# Patient Record
Sex: FEMALE | Race: WHITE | NOT HISPANIC OR LATINO | Employment: FULL TIME | URBAN - NONMETROPOLITAN AREA
[De-identification: names, ages, dates, MRNs, and addresses within clinical notes are randomized per-mention and may not be internally consistent; named-entity substitution may affect disease eponyms.]

---

## 2017-01-05 ENCOUNTER — OFFICE VISIT (OUTPATIENT)
Dept: URGENT CARE | Facility: CLINIC | Age: 37
End: 2017-01-05
Payer: COMMERCIAL

## 2017-01-05 PROCEDURE — G0382 LEV 3 HOSP TYPE B ED VISIT: HCPCS

## 2018-02-06 ENCOUNTER — OFFICE VISIT (OUTPATIENT)
Dept: URGENT CARE | Facility: CLINIC | Age: 38
End: 2018-02-06
Payer: COMMERCIAL

## 2018-02-06 VITALS — HEART RATE: 77 BPM | RESPIRATION RATE: 20 BRPM | OXYGEN SATURATION: 96 % | TEMPERATURE: 98.7 F

## 2018-02-06 DIAGNOSIS — J01.90 ACUTE SINUSITIS, RECURRENCE NOT SPECIFIED, UNSPECIFIED LOCATION: Primary | ICD-10-CM

## 2018-02-06 PROCEDURE — 99213 OFFICE O/P EST LOW 20 MIN: CPT | Performed by: PHYSICIAN ASSISTANT

## 2018-02-06 RX ORDER — LEVONORGESTREL AND ETHINYL ESTRADIOL AND ETHINYL ESTRADIOL 150-30(84)
KIT ORAL
COMMUNITY
Start: 2018-01-21 | End: 2020-02-18 | Stop reason: ALTCHOICE

## 2018-02-06 RX ORDER — AMOXICILLIN AND CLAVULANATE POTASSIUM 875; 125 MG/1; MG/1
1 TABLET, FILM COATED ORAL EVERY 12 HOURS SCHEDULED
Qty: 20 TABLET | Refills: 0 | Status: SHIPPED | COMMUNITY
Start: 2018-02-06 | End: 2018-02-16

## 2018-02-06 NOTE — PROGRESS NOTES
[address]  (office) [phone]  (fax) [phone]        NAME: Martina English is a 30 y o  female  : 1980    MRN: [id number]  DATE: 2018  TIME: 6:37 PM    Assessment and Plan   Acute sinusitis, recurrence not specified, unspecified location [J01 90]  1  Acute sinusitis, recurrence not specified, unspecified location  amoxicillin-clavulanate (AUGMENTIN) 875-125 mg per tablet         Patient Instructions   I have prescribed an antibiotic for the infection  Please take the antibiotic as prescribed and finish the entire prescription  I recommend that the patient takes an over the counter probiotic or eats yogurt with live cultures in it [place]) to keep good bacteria in the gut and help prevent diarrhea  Wash hands frequently to prevent the spread of infection  Can use over the counter cough and cold medications to help with symptoms  Ibuprofen and/or tylenol as needed for pain or fever  If not improving over the next 7-10 days, follow up with PCP  To present to the ER if symptoms worsen  Chief Complaint     Chief Complaint   Patient presents with    Facial Pain     Started 3 weks ago went away then came back  Ian Salgado LPN          History of Present Illness   Martina English presents to the clinic c/o    Sinusitis   This is a chronic problem  The current episode started 1 to 4 weeks ago  The problem is unchanged  There has been no fever  Associated symptoms include congestion, ear pain, headaches, sinus pressure and swollen glands  Pertinent negatives include no chills, coughing, diaphoresis, neck pain, shortness of breath, sneezing or sore throat  Past treatments include nothing  Review of Systems   Review of Systems   Constitutional: Negative for activity change, appetite change, chills, diaphoresis, fatigue and fever  HENT: Positive for congestion, ear pain, sinus pain and sinus pressure  Negative for ear discharge, facial swelling, rhinorrhea, sneezing and sore throat  Eyes: Negative for photophobia, pain, discharge, redness, itching and visual disturbance  Respiratory: Negative for apnea, cough, chest tightness, shortness of breath and wheezing  Cardiovascular: Negative for chest pain  Gastrointestinal: Negative for abdominal distention, abdominal pain, anal bleeding, blood in stool, constipation, diarrhea, nausea and vomiting  Genitourinary: Negative for dysuria, flank pain, frequency, hematuria and urgency  Musculoskeletal: Negative for arthralgias, back pain, gait problem, joint swelling, myalgias, neck pain and neck stiffness  Skin: Negative for color change, rash and wound  Allergic/Immunologic: Negative for immunocompromised state  Neurological: Positive for headaches  Negative for dizziness and facial asymmetry  Hematological: Negative for adenopathy  Psychiatric/Behavioral: Negative for confusion and decreased concentration  Current Medications     Long-Term Prescriptions   Medication Sig Dispense Refill    ASHLYNA 0 15-0 03 &0 01 MG TABS          Current Allergies     Allergies as of 02/06/2018    (No Known Allergies)            The following portions of the patient's history were reviewed and updated as appropriate: allergies, current medications, past family history, past medical history, past social history, past surgical history and problem list     Objective   Pulse 77   Temp 98 7 °F (37 1 °C) (Tympanic)   Resp 20   SpO2 96%        Physical Exam     Physical Exam   Constitutional: She is oriented to person, place, and time  She appears well-developed and well-nourished  No distress  HENT:   Head: Normocephalic and atraumatic  Right Ear: Tympanic membrane and external ear normal    Left Ear: Tympanic membrane and external ear normal    Nose: Mucosal edema present  Right sinus exhibits frontal sinus tenderness   Right sinus exhibits no maxillary sinus tenderness  Left sinus exhibits frontal sinus tenderness  Left sinus exhibits no maxillary sinus tenderness  Mouth/Throat: Oropharynx is clear and moist  No oropharyngeal exudate  Eyes: Conjunctivae and EOM are normal  Pupils are equal, round, and reactive to light  Right eye exhibits no discharge  Left eye exhibits no discharge  No scleral icterus  Neck: Normal range of motion  Neck supple  No JVD present  No tracheal deviation present  No thyromegaly present  Cardiovascular: Normal rate, regular rhythm and normal heart sounds  Exam reveals no gallop and no friction rub  No murmur heard  Pulmonary/Chest: Effort normal and breath sounds normal  No stridor  No respiratory distress  She has no wheezes  She has no rales  She exhibits no tenderness  Abdominal: Soft  Bowel sounds are normal  She exhibits no distension and no mass  There is no tenderness  There is no rebound and no guarding  Musculoskeletal: Normal range of motion  She exhibits no tenderness or deformity  Lymphadenopathy:     She has no cervical adenopathy  Neurological: She is alert and oriented to person, place, and time  She has normal reflexes  Coordination normal    Skin: Skin is warm and dry  No rash noted  She is not diaphoretic  No erythema  No pallor  Psychiatric: She has a normal mood and affect  Her behavior is normal  Judgment and thought content normal    Nursing note and vitals reviewed        Vicky Higgins PA-C

## 2018-02-06 NOTE — PATIENT INSTRUCTIONS
I have prescribed an antibiotic for the infection  Please take the antibiotic as prescribed and finish the entire prescription  I recommend that the patient takes an over the counter probiotic or eats yogurt with live cultures in it [place]) to keep good bacteria in the gut and help prevent diarrhea  Wash hands frequently to prevent the spread of infection  Can use over the counter cough and cold medications to help with symptoms  Ibuprofen and/or tylenol as needed for pain or fever  If not improving over the next 7-10 days, follow up with PCP  To present to the ER if symptoms worsen

## 2019-01-14 ENCOUNTER — OFFICE VISIT (OUTPATIENT)
Dept: OBGYN CLINIC | Facility: MEDICAL CENTER | Age: 39
End: 2019-01-14
Payer: COMMERCIAL

## 2019-01-14 ENCOUNTER — APPOINTMENT (OUTPATIENT)
Dept: RADIOLOGY | Facility: CLINIC | Age: 39
End: 2019-01-14
Payer: COMMERCIAL

## 2019-01-14 VITALS
HEART RATE: 88 BPM | SYSTOLIC BLOOD PRESSURE: 130 MMHG | HEIGHT: 67 IN | BODY MASS INDEX: 37.04 KG/M2 | WEIGHT: 236 LBS | DIASTOLIC BLOOD PRESSURE: 85 MMHG

## 2019-01-14 DIAGNOSIS — M22.40 CHONDROMALACIA PATELLAE SYNDROME, UNSPECIFIED LATERALITY: ICD-10-CM

## 2019-01-14 DIAGNOSIS — M25.561 PAIN IN BOTH KNEES, UNSPECIFIED CHRONICITY: Primary | ICD-10-CM

## 2019-01-14 DIAGNOSIS — M25.561 PAIN IN BOTH KNEES, UNSPECIFIED CHRONICITY: ICD-10-CM

## 2019-01-14 DIAGNOSIS — M25.562 PAIN IN BOTH KNEES, UNSPECIFIED CHRONICITY: Primary | ICD-10-CM

## 2019-01-14 DIAGNOSIS — M22.8X9 PATELLAR MALTRACKING, UNSPECIFIED LATERALITY: ICD-10-CM

## 2019-01-14 DIAGNOSIS — R29.898 WEAKNESS OF HIP, UNSPECIFIED LATERALITY: ICD-10-CM

## 2019-01-14 DIAGNOSIS — M25.562 PAIN IN BOTH KNEES, UNSPECIFIED CHRONICITY: ICD-10-CM

## 2019-01-14 PROCEDURE — 73564 X-RAY EXAM KNEE 4 OR MORE: CPT

## 2019-01-14 PROCEDURE — 99203 OFFICE O/P NEW LOW 30 MIN: CPT | Performed by: ORTHOPAEDIC SURGERY

## 2019-01-14 NOTE — PROGRESS NOTES
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  Plan:    Conservative treatment:    Ice to knee for 20 minutes at least 1-2 times daily  PT for ROM/strengthening to knee, hip and core  OTC NSAIDS prn for pain  Tylenol for pain  Weight loss discussed  Keep the knee straight whenever possilble  Let pain guide gradual return activities  Imaging: All imaging from today was reviewed by myself and explained to the patient  May consider an MRI at our next visit if her symptoms worsen or fail to improve by our next visit  Injection:    No Injection planned at this time  Surgery:     No surgery is recommended at this point, continue with conservative treatment plan as noted  Follow up:    Return in about 8 weeks (around 3/11/2019) for Recheck  Chief Complaint   Patient presents with    Right Knee - Pain    Left Knee - Pain       History of Present Illness: 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, referred to me by their primary care physician, seen in clinic for consultation of right greater than left knee pain  Linda Ferguson reports with 4 months of right greater than left knee pain  The pain was insidious in nature  At today's visit, the pain is located anterior, lateral, medial   The patient rates the pain as a 6/10  The pain has been present for 4 months  The pain is characterized as sharp, stabbing, dull, achy  The pain is present at all times  Pain is improved by rest, ice and NSAIDS  Pain is aggravated by stairs and sitting  Symptoms include cracking  The patient has tried rest, ice and NSAIDS  Linda Ferguson notes 15 years ago she had worsening right knee pain that she did physical therapy for and had an MRI         Knee Surgical History:  None    Past Medical, Social and Family History:  History reviewed  No pertinent past medical history  Past Surgical History:   Procedure Laterality Date     SECTION       No Known Allergies  Current Outpatient Prescriptions on File Prior to Visit   Medication Sig Dispense Refill    ASHLYNA 0 15-0 03 &0 01 MG TABS        No current facility-administered medications on file prior to visit  Social History     Social History    Marital status: /Civil Union     Spouse name: N/A    Number of children: N/A    Years of education: N/A     Occupational History    Not on file  Social History Main Topics    Smoking status: Never Smoker    Smokeless tobacco: Never Used    Alcohol use Yes      Comment: ocassional    Drug use: No    Sexual activity: Not on file     Other Topics Concern    Not on file     Social History Narrative    No narrative on file         I have reviewed the past medical, surgical, social and family history, medications and allergies as documented in the EMR  Review of systems: ROS is negative other than that noted in the HPI  Constitutional: Negative for fatigue and fever  HENT: Negative for sore throat  Respiratory: Negative for shortness of breath  Cardiovascular: Negative for chest pain  Gastrointestinal: Negative for abdominal pain  Endocrine: Negative for cold intolerance and heat intolerance  Genitourinary: Negative for flank pain  Musculoskeletal: Negative for back pain  Skin: Negative for rash  Allergic/Immunologic: Negative for immunocompromised state  Neurological: Negative for dizziness  Psychiatric/Behavioral: Negative for agitation  Physical Exam:    Blood pressure 130/85, pulse 88, height 5' 7" (1 702 m), weight 107 kg (236 lb)      General/Constitutional: NAD, well developed, well nourished  HENT: Normocephalic, atraumatic  CV: Intact distal pulses, regular rate  Resp: No respiratory distress or labored breathing  Lymphatic: No lymphadenopathy palpated  Neuro: Alert and Oriented x 3, no focal deficits  Psych: Normal mood, normal affect, normal judgement, normal behavior  Skin: Warm, dry, no rashes, no erythema      Knee Exam (focused):                RIGHT LEFT   ROM:   -5-130 -5-130   Palpation: Effusion negative negative     MJL tenderness Negative Negative     LJL tenderness Negative Negative   Instability: Varus stable stable     Valgus stable stable   Special Tests: Lachman Negative Negative     Posterior drawer Negative Negative     Anterior drawer Negative Negative     Pivot shift not tested not tested     Dial not tested not tested   Patella: Palpation no tenderness no tenderness     Mobility 1/2 1/2     Apprehension Negative Negative    J Sign Positive Positive   Other: Single leg 1/4 squat not tested not tested      LE NV Exam: +2 DP/PT pulses bilaterally  Sensation intact to light touch L2-S1 bilaterally     Bilateral hip ROM demonstrates no pain actively or passively    No calf tenderness to palpation bilaterally    Knee Imaging    X-rays of the bilateral knee were reviewed, which demonstrate no acute fracture, dislocation, or osseous abnormalities  I have reviewed the radiology report and do not currently have the reading from [facility] but will review it when received         Scribe Attestation    I,:   Ezekiel Calloway am acting as a scribe while in the presence of the attending physician :        I,:   Mina Dillon, DO personally performed the services described in this documentation    as scribed in my presence :

## 2019-01-16 ENCOUNTER — EVALUATION (OUTPATIENT)
Dept: PHYSICAL THERAPY | Facility: CLINIC | Age: 39
End: 2019-01-16
Payer: COMMERCIAL

## 2019-01-16 DIAGNOSIS — M25.561 PAIN IN BOTH KNEES, UNSPECIFIED CHRONICITY: ICD-10-CM

## 2019-01-16 DIAGNOSIS — M25.562 PAIN IN BOTH KNEES, UNSPECIFIED CHRONICITY: ICD-10-CM

## 2019-01-16 DIAGNOSIS — R29.898 WEAKNESS OF HIP, UNSPECIFIED LATERALITY: ICD-10-CM

## 2019-01-16 DIAGNOSIS — M22.40 CHONDROMALACIA PATELLAE SYNDROME, UNSPECIFIED LATERALITY: ICD-10-CM

## 2019-01-16 DIAGNOSIS — M22.8X9 PATELLAR MALTRACKING, UNSPECIFIED LATERALITY: ICD-10-CM

## 2019-01-16 PROCEDURE — 97535 SELF CARE MNGMENT TRAINING: CPT | Performed by: PHYSICAL THERAPIST

## 2019-01-16 PROCEDURE — G8979 MOBILITY GOAL STATUS: HCPCS | Performed by: PHYSICAL THERAPIST

## 2019-01-16 PROCEDURE — 97110 THERAPEUTIC EXERCISES: CPT | Performed by: PHYSICAL THERAPIST

## 2019-01-16 PROCEDURE — 97140 MANUAL THERAPY 1/> REGIONS: CPT | Performed by: PHYSICAL THERAPIST

## 2019-01-16 PROCEDURE — 97161 PT EVAL LOW COMPLEX 20 MIN: CPT | Performed by: PHYSICAL THERAPIST

## 2019-01-16 PROCEDURE — G8978 MOBILITY CURRENT STATUS: HCPCS | Performed by: PHYSICAL THERAPIST

## 2019-01-16 NOTE — PROGRESS NOTES
PT Evaluation     Today's date: 2019  Patient name: Laurie Campbell  : 1980  MRN: [id number]  Referring provider: Susannah Tomlinson DO  Dx:   Encounter Diagnosis     ICD-10-CM    1  Pain in both knees, unspecified chronicity M25 561 Ambulatory referral to Physical Therapy    M25 562    2  Chondromalacia patellae syndrome, unspecified laterality M22 40 Ambulatory referral to Physical Therapy   3  Patellar maltracking, unspecified laterality M22 8X9 Ambulatory referral to Physical Therapy   4  Weakness of hip, unspecified laterality R29 898 Ambulatory referral to Physical Therapy                  Assessment  Assessment details: PT notes the patient with decrease strength t/o the bilateral knee and LE leading to patellar mistracking with muscular imbalance with increase pain levels and functional limitations with need for course of skilled therapy for 2-4 weeks with focus on strengthening, manual therapy, analgesic modalities, and HEP  Impairments: activity intolerance, impaired physical strength, lacks appropriate home exercise program and pain with function  Understanding of Dx/Px/POC: good   Prognosis: good    Goals  ST  Initiate HEP  2  Decrease pain by 25-50%  3  Increase strength by 1-2 mm grades  LT  Decrease limitations with squatting and stairs  2  Return to recreational and gym/training activities  3  Improve LE flexibility  4  DC with HEP    Plan  Plan details: PT notes review of POC and findings with patient who is agreement with PT recommendations of course of skilled therapy     Patient would benefit from: PT eval and skilled physical therapy  Planned modality interventions: cryotherapy and thermotherapy: hydrocollator packs  Planned therapy interventions: balance, manual therapy, neuromuscular re-education, patient education, self care, strengthening, stretching, therapeutic exercise, home exercise program, graded exercise and flexibility  Frequency: 2x week  Duration in visits: 8  Duration in weeks: 4  Treatment plan discussed with: patient        Subjective Evaluation    History of Present Illness  Mechanism of injury: Patient reports dealing with bilateral knee pain for 10+ years but a recent flare up in symptoms about 3-4 months ago from unknown cause  Patient reports the increase symptoms has lead to limitations with squatting, ADL, recreational activities, and stairs  Patient went to ortho MD for evaluation and evaluated by x-ray with normal findings  Patient was found not to be a candidate for analgesic injection or surgery so referred to OPPT for evaluation and treatment of bilateral knee pain  Pain  Current pain ratin  At best pain ratin  At worst pain ratin  Location: Bilateral R>L   Quality: dull ache and sharp  Relieving factors: rest  Aggravating factors: stair climbing      Diagnostic Tests  X-ray: normal  Treatments  Current treatment: physical therapy  Patient Goals  Patient goals for therapy: decreased pain, increased strength, independence with ADLs/IADLs and return to sport/leisure activities          Objective     Tenderness     Additional Tenderness Details  PT notes no pain with palpation to the bilateral knees     Neurological Testing     Reflexes   Left   Patellar (L4): normal (2+)  Achilles (S1): normal (2+)    Right   Patellar (L4): normal (2+)  Achilles (S1): normal (2+)    Active Range of Motion   Left Hip   Flexion: 51 degrees     Right Hip   Flexion: 50 degrees   Left Knee   Normal active range of motion    Right Knee   Normal active range of motion  Left Ankle/Foot   Normal active range of motion    Right Ankle/Foot   Normal active range of motion    Additional Active Range of Motion Details  PT notes bilateral HS tightness     Passive Range of Motion   Left Hip   Flexion: 55 degrees     Right Hip   Flexion: 57 degrees     Mobility   Patellar Mobility:   Left Knee   WFL: medial, lateral, superior and inferior       Right Knee   WFL: medial, lateral, superior and inferior    Strength/Myotome Testing     Left Hip   Planes of Motion   Flexion: 4+  Extension: 5  Abduction: 4+  Adduction: 5  External rotation: 4  Internal rotation: 4+    Right Hip   Planes of Motion   Flexion: 4  Extension: 5  Abduction: 4+  Adduction: 5  External rotation: 4-  Internal rotation: 4    Left Knee   Flexion: 5  Extension: 4+    Right Knee   Flexion: 5  Extension: 4    Left Ankle/Foot   Normal strength    Right Ankle/Foot   Normal strength    Additional Strength Details  PT notes decrease strength t/o the bilateral knees and LE     Tests     Left Knee   Negative anterior Lachman, lateral Kami, medial Kami, posterior drawer, valgus stress test at 0 degrees and varus stress test at 0 degrees  Right Knee   Negative anterior Lachman, lateral Kami, medial Kami, posterior drawer, valgus stress test at 0 degrees and varus stress test at 0 degrees  Ambulation     Observational Gait   Walking speed and stride length within functional limits  Additional Observational Gait Details  PT notes the patient with demonstration of normal gait pattern and balance with static and dynamic activities         Flowsheet Rows      Most Recent Value   PT/OT G-Codes   FOTO information reviewed  Yes   Assessment Type  Evaluation   G code set  Mobility: Walking & Moving Around   Mobility: Walking and Moving Around Current Status ()  CK   Mobility: Walking and Moving Around Goal Status ()  CJ          Precautions:  None     Specialty Daily Treatment Diary     Manual  1/16       Bilateral hip and LE stretching and manual hip traction 15 min                                            Exercise Diary  1/16       [facility]  10 min L1        TBall wall squats with ball squeeze         Hip AB/AD machine         Leg and calf press-Bilat and S/L         Elliptical         Cable column walk-outs   All directions         Seated Tball trunk rotation and PNF        Seated Hip march and LACARLY MANSFIELD SLCHANEL         Lunge front and lateral         Monster walk        Side step and squat         S/L open door with TB         S/L hip ADD        Bridge with AB/AD                                            Modalities 1/16        CP/MHP to the bilateral knees in seated  PRN

## 2019-01-18 ENCOUNTER — APPOINTMENT (OUTPATIENT)
Dept: PHYSICAL THERAPY | Facility: CLINIC | Age: 39
End: 2019-01-18
Payer: COMMERCIAL

## 2019-01-21 ENCOUNTER — OFFICE VISIT (OUTPATIENT)
Dept: PHYSICAL THERAPY | Facility: CLINIC | Age: 39
End: 2019-01-21
Payer: COMMERCIAL

## 2019-01-21 DIAGNOSIS — M22.8X9 PATELLAR MALTRACKING, UNSPECIFIED LATERALITY: ICD-10-CM

## 2019-01-21 DIAGNOSIS — M25.561 PAIN IN BOTH KNEES, UNSPECIFIED CHRONICITY: Primary | ICD-10-CM

## 2019-01-21 DIAGNOSIS — M25.562 PAIN IN BOTH KNEES, UNSPECIFIED CHRONICITY: Primary | ICD-10-CM

## 2019-01-21 DIAGNOSIS — M22.40 CHONDROMALACIA PATELLAE SYNDROME, UNSPECIFIED LATERALITY: ICD-10-CM

## 2019-01-21 DIAGNOSIS — R29.898 WEAKNESS OF HIP, UNSPECIFIED LATERALITY: ICD-10-CM

## 2019-01-21 PROCEDURE — 97112 NEUROMUSCULAR REEDUCATION: CPT

## 2019-01-21 PROCEDURE — 97110 THERAPEUTIC EXERCISES: CPT

## 2019-01-21 PROCEDURE — 97140 MANUAL THERAPY 1/> REGIONS: CPT

## 2019-01-21 NOTE — PROGRESS NOTES
Daily Note     Today's date: 2019  Patient name: Lydia Glass  : 1980  MRN: [id number]  Referring provider: Jd Sánchez DO  Dx:   Encounter Diagnosis     ICD-10-CM    1  Pain in both knees, unspecified chronicity M25 561     M25 562    2  Chondromalacia patellae syndrome, unspecified laterality M22 40    3  Patellar maltracking, unspecified laterality M22 8X9    4  Weakness of hip, unspecified laterality R29 898                   Subjective: "I feel a little better  I think I am more cautious with how I am doing things "      Objective: See treatment diary below    Specialty Daily Treatment Diary     Manual        Bilateral hip and LE stretching and manual hip traction 15 min  15 min                                          Exercise Diary        [facility]  10 min L1  10 min L3      TBall wall squats with ball squeeze   2x10 Orange Tball Red PGB      Hip AB/AD machine   45+1# AB  45+1# AD      Leg and calf press-Bilat and S/L   Bilat Only 50# 2x10      Elliptical         Cable column walk-outs   All directions   36# 5x ea direction      Seated Tball trunk rotation and PNF        Seated Hip march and LAQ         BOSU march   2 min      BOSU SLR   10x ea Bilat      Lunge front and lateral         Monster walk  Green 6x 10 ft      Side step and squat   Green 6x 10 ft      S/L open door with TB   2x10 Green      S/L hip ABD        Bridge with AB/AD  2x10   AD Red Ball AB Green Band                                          Modalities       CP/MHP to the bilateral knees in seated  PRN  Declined                                Assessment: Tolerated treatment well  Patient was moderately restricted in HS, rotator & Quad flexibility  Patient was challenged by [place] activities  Patient demonstrated fatigue post treatment  Patient would benefit from continued skilled therapy to improve flexibility, strength and balance  Plan: Progress treatment as tolerated

## 2019-01-23 ENCOUNTER — OFFICE VISIT (OUTPATIENT)
Dept: PHYSICAL THERAPY | Facility: CLINIC | Age: 39
End: 2019-01-23
Payer: COMMERCIAL

## 2019-01-23 DIAGNOSIS — M25.561 PAIN IN BOTH KNEES, UNSPECIFIED CHRONICITY: Primary | ICD-10-CM

## 2019-01-23 DIAGNOSIS — M22.40 CHONDROMALACIA PATELLAE SYNDROME, UNSPECIFIED LATERALITY: ICD-10-CM

## 2019-01-23 DIAGNOSIS — R29.898 WEAKNESS OF HIP, UNSPECIFIED LATERALITY: ICD-10-CM

## 2019-01-23 DIAGNOSIS — M25.562 PAIN IN BOTH KNEES, UNSPECIFIED CHRONICITY: Primary | ICD-10-CM

## 2019-01-23 DIAGNOSIS — M22.8X9 PATELLAR MALTRACKING, UNSPECIFIED LATERALITY: ICD-10-CM

## 2019-01-23 PROCEDURE — 97110 THERAPEUTIC EXERCISES: CPT | Performed by: PHYSICAL THERAPIST

## 2019-01-23 PROCEDURE — 97140 MANUAL THERAPY 1/> REGIONS: CPT | Performed by: PHYSICAL THERAPIST

## 2019-01-23 NOTE — PROGRESS NOTES
Daily Note     Today's date: 2019  Patient name: Janay Vasquez  : 1980  MRN: [id number]  Referring provider: Basilia Argueta DO  Dx:   Encounter Diagnosis     ICD-10-CM    1  Pain in both knees, unspecified chronicity M25 561     M25 562    2  Chondromalacia patellae syndrome, unspecified laterality M22 40    3  Patellar maltracking, unspecified laterality M22 8X9    4  Weakness of hip, unspecified laterality R29 898                   Subjective:  Patient reports the knees have been feeling better since the start of therapy  Patient denies any pain in the knees t/o the session  Objective: See treatment diary below    Specialty Daily Treatment Diary     Manual       Bilateral hip and LE stretching and manual hip traction 15 min  15 min 15 min                                          Exercise Diary       [facility]  10 min L1  10 min L3 10 min L3      TBall wall squats with ball squeeze   2x10 Orange Tball Red PGB HEP      Hip AB/AD machine   45+1# AB  45+1# AD 2x10   45+1#/AD AB      Leg and calf press-Bilat and S/L   Bilat Only 50# 2x10 2x10   Bilat 50#      Elliptical         Cable column walk-outs   All directions   36# 5x ea direction 36#   5X Each   Direction      Seated Tball trunk rotation and PNF        Seated Hip march and LAQ    2x10 Bilat  2 0#      BOSU march   2 min 3 min      BOSU SLR   10x ea Bilat 10x Bilat     Lunge front and lateral         Monster walk  Green 6x 10 ft 6x10 Feet   Blue      Side step and squat   Green 6x 10 ft 6x10 Feet   Blue      S/L open door with TB   2x10 Green HEP      S/L hip ABD        Bridge with AB/AD  2x10   AD Red Ball AB Green Band HEP                                          Modalities      CP/MHP to the bilateral knees in seated  PRN  Declined Patient   Declined                                Assessment: Tolerated treatment well    PT notes continuation of progression of TE program with focus on gait/balance and manual therapy to decrease pain levels and improve functional limitations to meet therapy goals  PT will plan on DC with HEP next week secondary to patient with high copay with insurance and overall lower pain levels in the knees  Plan: Continue per plan of care

## 2019-01-28 ENCOUNTER — OFFICE VISIT (OUTPATIENT)
Dept: PHYSICAL THERAPY | Facility: CLINIC | Age: 39
End: 2019-01-28
Payer: COMMERCIAL

## 2019-01-28 DIAGNOSIS — M25.561 PAIN IN BOTH KNEES, UNSPECIFIED CHRONICITY: Primary | ICD-10-CM

## 2019-01-28 DIAGNOSIS — M22.8X9 PATELLAR MALTRACKING, UNSPECIFIED LATERALITY: ICD-10-CM

## 2019-01-28 DIAGNOSIS — R29.898 WEAKNESS OF HIP, UNSPECIFIED LATERALITY: ICD-10-CM

## 2019-01-28 DIAGNOSIS — M25.562 PAIN IN BOTH KNEES, UNSPECIFIED CHRONICITY: Primary | ICD-10-CM

## 2019-01-28 DIAGNOSIS — M22.40 CHONDROMALACIA PATELLAE SYNDROME, UNSPECIFIED LATERALITY: ICD-10-CM

## 2019-01-28 PROCEDURE — 97140 MANUAL THERAPY 1/> REGIONS: CPT | Performed by: PHYSICAL THERAPIST

## 2019-01-28 PROCEDURE — 97110 THERAPEUTIC EXERCISES: CPT | Performed by: PHYSICAL THERAPIST

## 2019-01-28 PROCEDURE — 97112 NEUROMUSCULAR REEDUCATION: CPT | Performed by: PHYSICAL THERAPIST

## 2019-01-28 NOTE — PROGRESS NOTES
Daily Note     Today's date: 2019  Patient name: Karlene Schultz  : 1980  MRN: [id number]  Referring provider: Yashira Caldera DO  Dx:   Encounter Diagnosis     ICD-10-CM    1  Pain in both knees, unspecified chronicity M25 561     M25 562    2  Chondromalacia patellae syndrome, unspecified laterality M22 40    3  Weakness of hip, unspecified laterality R29 898    4  Patellar maltracking, unspecified laterality M22 8X9                   Subjective:  Patient reports the knees felt pretty good over the weekend with no pain or limitations with lifting and carrying household objects up and down stairs  Patient reports minimal soreness in the bilateral knees at the start of the session           Objective: See treatment diary below    Specialty Daily Treatment Diary     Manual       Bilateral hip and LE stretching and manual hip traction 15 min  15 min 15 min  15 min                                         Exercise Diary      [facility]  10 min L1  10 min L3 10 min L3  10 min L3     TBall wall squats with ball squeeze   2x10 Orange Tball Red PGB HEP      Hip AB/AD machine   45+1# AB  45+1# AD 2x10   45+1#/AD AB  2x10   60#/AB  45+1#/AD    Leg and calf press-Bilat and S/L   Bilat Only 50# 2x10 2x10   Bilat 50#  2x10   Bilat  60#     Elliptical         Cable column walk-outs   All directions   36# 5x ea direction 36#   5X Each   Direction  36#   5X Each Direction     Seated Tball trunk rotation and PNF        Seated Tball Hip march and LAQ    2x10 Bilat  2 0#  NT     BOSU march   2 min 3 min  3 min     BOSU SLR   10x ea Bilat 10x Bilat 10x Bilat     Lunge front and lateral     2x10 Bilat     Monster walk  Green 6x 10 ft 6x10 Feet   Blue      Side step and squat   Green 6x 10 ft 6x10 Feet   Blue      S/L open door with TB   2x10 Green HEP      S/L hip ABD        Bridge with AB/AD  2x10   AD Red Ball AB Green Band HEP                                          Modalities  1/23 1/28     CP/MHP to the bilateral knees in seated  PRN  Declined Patient   Declined  Patient   Declined                             Assessment: Tolerated treatment well  PT notes modified treatment secondary to time constraints but PT will continue to progress toward therapy goals and full TE session  PT will plan on DC with HEP next session  Plan: Continue per plan of care

## 2019-01-30 ENCOUNTER — APPOINTMENT (OUTPATIENT)
Dept: PHYSICAL THERAPY | Facility: CLINIC | Age: 39
End: 2019-01-30
Payer: COMMERCIAL

## 2019-02-06 ENCOUNTER — OFFICE VISIT (OUTPATIENT)
Dept: PHYSICAL THERAPY | Facility: CLINIC | Age: 39
End: 2019-02-06
Payer: COMMERCIAL

## 2019-02-06 DIAGNOSIS — M25.561 PAIN IN BOTH KNEES, UNSPECIFIED CHRONICITY: Primary | ICD-10-CM

## 2019-02-06 DIAGNOSIS — R29.898 WEAKNESS OF HIP, UNSPECIFIED LATERALITY: ICD-10-CM

## 2019-02-06 DIAGNOSIS — M22.40 CHONDROMALACIA PATELLAE SYNDROME, UNSPECIFIED LATERALITY: ICD-10-CM

## 2019-02-06 DIAGNOSIS — M25.562 PAIN IN BOTH KNEES, UNSPECIFIED CHRONICITY: Primary | ICD-10-CM

## 2019-02-06 DIAGNOSIS — M22.8X9 PATELLAR MALTRACKING, UNSPECIFIED LATERALITY: ICD-10-CM

## 2019-02-06 PROCEDURE — 97140 MANUAL THERAPY 1/> REGIONS: CPT | Performed by: PHYSICAL THERAPIST

## 2019-02-06 PROCEDURE — 97110 THERAPEUTIC EXERCISES: CPT | Performed by: PHYSICAL THERAPIST

## 2019-02-06 PROCEDURE — 97535 SELF CARE MNGMENT TRAINING: CPT | Performed by: PHYSICAL THERAPIST

## 2019-02-06 NOTE — PROGRESS NOTES
PT Discharge    Today's date: 2019  Patient name: Leticia Verma  : 1980  MRN: [id number]  Referring provider: Mel Jaffe DO  Dx:   Encounter Diagnosis     ICD-10-CM    1  Pain in both knees, unspecified chronicity M25 561     M25 562    2  Chondromalacia patellae syndrome, unspecified laterality M22 40    3  Weakness of hip, unspecified laterality R29 898    4  Patellar maltracking, unspecified laterality M22 8X9                   Assessment  Assessment details: PT notes the patient with improved strength t/o the bilateral knee and LE with improved patellar tracking as well as improved flexibility t/o the bilateral hip and LE  PT notes the patient has met all therapy goals and is ready for a DC with HEP  Impairments: activity intolerance, impaired physical strength, lacks appropriate home exercise program and pain with function  Understanding of Dx/Px/POC: good   Prognosis: good    Goals  ST  Initiate HEP-MET   2  Decrease pain by 25-50%-MET   3  Increase strength by 1-2 mm grades-MET   LT  Decrease limitations with squatting and stairs-MET  2  Return to recreational and gym/training activities-MET   3  Improve LE flexibility-MET  4   DC with HEP-MET    Plan  Plan details: DC with HEP  Patient would benefit from: PT eval and skilled physical therapy  Planned modality interventions: cryotherapy and thermotherapy: hydrocollator packs  Planned therapy interventions: balance, manual therapy, neuromuscular re-education, patient education, self care, strengthening, stretching, therapeutic exercise, home exercise program, graded exercise and flexibility  Frequency: 2x week  Duration in visits: 1  Duration in weeks: 1  Treatment plan discussed with: patient        Subjective Evaluation    History of Present Illness  Mechanism of injury: Patient reports dealing with bilateral knee pain for 10+ years but a recent flare up in symptoms about 3-4 months ago from unknown cause    Patient reports the increase symptoms has lead to limitations with squatting, ADL, recreational activities, and stairs  Patient went to ortho MD for evaluation and evaluated by x-ray with normal findings  Patient was found not to be a candidate for analgesic injection or surgery so referred to OPPT for evaluation and treatment of bilateral knee pain  Presently the patient has attended 5 sessions of skilled therapy and feels 80-90% improvement since the start of therapy  Patient reports pain levels are down with increase strength and flexibility in the LE  Patient reports she is happy with current course of skilled therapy and feels she is ready for a DC with HEP     Pain  Current pain ratin  At best pain ratin  At worst pain ratin  Location: Bilateral R>L   Quality: dull ache and sharp  Relieving factors: rest  Aggravating factors: stair climbing  Progression: improved      Diagnostic Tests  X-ray: normal  Treatments  Current treatment: physical therapy  Patient Goals  Patient goals for therapy: decreased pain, increased strength, independence with ADLs/IADLs and return to sport/leisure activities          Objective     Tenderness     Additional Tenderness Details  PT notes no pain with palpation to the bilateral knees     Neurological Testing     Reflexes   Left   Patellar (L4): normal (2+)  Achilles (S1): normal (2+)    Right   Patellar (L4): normal (2+)  Achilles (S1): normal (2+)    Active Range of Motion   Left Hip   Flexion: 60 degrees     Right Hip   Flexion: 58 degrees   Left Knee   Normal active range of motion    Right Knee   Normal active range of motion  Left Ankle/Foot   Normal active range of motion    Right Ankle/Foot   Normal active range of motion    Additional Active Range of Motion Details  PT notes improved bilateral LE flexibility     Passive Range of Motion   Left Hip   Flexion: 61 degrees     Right Hip   Flexion: 60 degrees     Mobility   Patellar Mobility:   Left Knee   WFL: medial, lateral, superior and inferior  Right Knee   WFL: medial, lateral, superior and inferior    Strength/Myotome Testing     Left Hip   Planes of Motion   Flexion: 5  Extension: 5  Abduction: 5  Adduction: 5  External rotation: 5  Internal rotation: 5    Right Hip   Planes of Motion   Flexion: 5  Extension: 5  Abduction: 5  Adduction: 5  External rotation: 5  Internal rotation: 5    Left Knee   Flexion: 5  Extension: 5    Right Knee   Flexion: 5  Extension: 5    Left Ankle/Foot   Normal strength    Right Ankle/Foot   Normal strength    Additional Strength Details  PT notes improved strength t/o the bilateral knees and LE     Tests     Left Knee   Negative anterior Lachman, lateral Kami, medial Kami, posterior drawer, valgus stress test at 0 degrees and varus stress test at 0 degrees  Right Knee   Negative anterior Lachman, lateral Kami, medial Kami, posterior drawer, valgus stress test at 0 degrees and varus stress test at 0 degrees  Ambulation     Observational Gait   Walking speed and stride length within functional limits  Additional Observational Gait Details  PT notes the patient with demonstration of normal gait pattern and balance with static and dynamic activities             Precautions:  None     Specialty Daily Treatment Diary     Manual  1/16 1/21 1/23 1/28  2/6    Bilateral hip and LE stretching and manual hip traction 15 min  15 min 15 min  15 min  15 min                                        Exercise Diary  1/16 1/21 1/23 1/28 2/6    SRC  10 min L1  10 min L3 10 min L3  10 min L3  10 min L5    TBall wall squats with ball squeeze   2x10 Orange Tball Red PGB HEP      Hip AB/AD machine   45+1# AB  45+1# AD 2x10   45+1#/AD AB  2x10   60#/AB  45+1#/AD    Leg and calf press-Bilat and S/L   Bilat Only 50# 2x10 2x10   Bilat 50#  2x10   Bilat  60#     Elliptical         Cable column walk-outs   All directions   36# 5x ea direction 36#   5X Each   Direction  36#   5X Each Direction Seated Tball trunk rotation and PNF        Seated Tball Hip march and LAQ    2x10 Bilat  2 0#  NT     BOSU march   2 min 3 min  3 min     BOSU SLR   10x ea Bilat 10x Bilat 10x Bilat     Lunge front and lateral     2x10 Bilat     Monster walk  Green 6x 10 ft 6x10 Feet   Blue      Side step and squat   Green 6x 10 ft 6x10 Feet   Blue      S/L open door with TB   2x10 Green HEP      S/L hip ABD        Bridge with AB/AD  2x10   AD Red Ball AB Green Band HEP      HEP Update/review     X                               Modalities 1/16 1/21 1/23 1/28  2/6    CP/MHP to the bilateral knees in seated  PRN  Declined Patient   Declined  Patient   Declined  Patient   Declined

## 2019-04-01 ENCOUNTER — APPOINTMENT (OUTPATIENT)
Dept: RADIOLOGY | Facility: CLINIC | Age: 39
End: 2019-04-01
Payer: COMMERCIAL

## 2019-04-01 ENCOUNTER — OFFICE VISIT (OUTPATIENT)
Dept: URGENT CARE | Facility: CLINIC | Age: 39
End: 2019-04-01
Payer: COMMERCIAL

## 2019-04-01 VITALS
OXYGEN SATURATION: 97 % | DIASTOLIC BLOOD PRESSURE: 74 MMHG | WEIGHT: 236 LBS | RESPIRATION RATE: 18 BRPM | SYSTOLIC BLOOD PRESSURE: 134 MMHG | HEART RATE: 82 BPM | HEIGHT: 67 IN | BODY MASS INDEX: 37.04 KG/M2 | TEMPERATURE: 98 F

## 2019-04-01 DIAGNOSIS — B96.89 ACUTE BACTERIAL BRONCHITIS: Primary | ICD-10-CM

## 2019-04-01 DIAGNOSIS — R05.9 COUGH: ICD-10-CM

## 2019-04-01 DIAGNOSIS — J20.8 ACUTE BACTERIAL BRONCHITIS: Primary | ICD-10-CM

## 2019-04-01 PROCEDURE — 71046 X-RAY EXAM CHEST 2 VIEWS: CPT

## 2019-04-01 PROCEDURE — 99213 OFFICE O/P EST LOW 20 MIN: CPT | Performed by: PHYSICIAN ASSISTANT

## 2019-04-01 RX ORDER — AZITHROMYCIN 250 MG/1
TABLET, FILM COATED ORAL
Qty: 6 TABLET | Refills: 0 | Status: SHIPPED | COMMUNITY
Start: 2019-04-01 | End: 2019-04-05

## 2020-02-18 ENCOUNTER — OFFICE VISIT (OUTPATIENT)
Dept: URGENT CARE | Facility: CLINIC | Age: 40
End: 2020-02-18
Payer: COMMERCIAL

## 2020-02-18 VITALS
TEMPERATURE: 98.6 F | WEIGHT: 246 LBS | OXYGEN SATURATION: 99 % | DIASTOLIC BLOOD PRESSURE: 84 MMHG | RESPIRATION RATE: 20 BRPM | BODY MASS INDEX: 38.61 KG/M2 | HEART RATE: 72 BPM | HEIGHT: 67 IN | SYSTOLIC BLOOD PRESSURE: 136 MMHG

## 2020-02-18 DIAGNOSIS — S05.02XA ABRASION OF LEFT CORNEA, INITIAL ENCOUNTER: Primary | ICD-10-CM

## 2020-02-18 PROCEDURE — 99213 OFFICE O/P EST LOW 20 MIN: CPT | Performed by: PHYSICIAN ASSISTANT

## 2020-02-18 RX ORDER — TOBRAMYCIN 3 MG/ML
1 SOLUTION/ DROPS OPHTHALMIC
Qty: 5 ML | Refills: 0 | Status: SHIPPED | COMMUNITY
Start: 2020-02-18

## 2020-02-18 NOTE — PROGRESS NOTES
[address] [facility], [place]  (office) [phone]  (fax) [phone]        NAME: Yony Jiménez is a 42 y o  female  : 1980    MRN: [id number]  DATE: 2020  TIME: 5:43 PM    Assessment and Plan   Abrasion of left cornea, initial encounter [S05  02XA]  1  Abrasion of left cornea, initial encounter  tobramycin (TOBREX) 0 3 % SOLN       Patient Instructions   a  You have scratched your cornea, which is the outer most covering of your eyeball   b  Your pain will lessen each day  Take acetaminophen or ibuprofen for your pain  You may also apply cool compresses to your eye for your pain  c  Take the antibiotic eye drops as prescribed  d  DO NOT cover your eye with an eyepatch and DO NOT apply any eye drops to your eye except the antibiotic drops prescribed to you today  This will delay the healing of your eye and can cause additional, severe eye problems  e  Avoid touching your eye and eyelid because this can introduce bacteria to the scratch in your eye  Follow up with your family doctor or your eye doctor if symptoms are not much improved within 5 days  To present to the ER if symptoms worsen  Chief Complaint     Chief Complaint   Patient presents with    Eye Pain     scratched L eye with her contact 2 days ago         History of Present Illness   Yony Jiménez presents to the clinic c/o    Eye Pain    The left eye is affected  This is a new problem  The current episode started in the past 7 days  The problem occurs constantly  The problem has been unchanged  The injury mechanism was contact lenses  The pain is moderate  There is no known exposure to pink eye  She does not wear contacts  Pertinent negatives include no blurred vision, eye discharge, double vision, eye redness, fever, foreign body sensation, itching, nausea, photophobia, recent URI or vomiting  She has tried commercial eye wash for the symptoms   The treatment provided no relief  Review of Systems   Review of Systems   Constitutional: Negative for activity change, appetite change, chills, diaphoresis, fatigue and fever  HENT: Negative for congestion, ear discharge, ear pain, facial swelling, rhinorrhea, sinus pressure, sinus pain, sneezing and sore throat  Eyes: Positive for pain  Negative for blurred vision, double vision, photophobia, discharge, redness, itching and visual disturbance  Respiratory: Negative for apnea, cough, chest tightness, shortness of breath and wheezing  Cardiovascular: Negative for chest pain  Gastrointestinal: Negative for abdominal distention, abdominal pain, constipation, diarrhea, nausea and vomiting  Genitourinary: Negative for dysuria, flank pain, frequency, hematuria and urgency  Musculoskeletal: Negative for arthralgias, back pain, gait problem, joint swelling, myalgias, neck pain and neck stiffness  Skin: Negative for color change, rash and wound  Allergic/Immunologic: Negative for immunocompromised state  Neurological: Negative for dizziness and headaches  Hematological: Negative for adenopathy  Psychiatric/Behavioral: Negative for confusion  Current Medications     No long-term medications on file  Current Allergies     Allergies as of 2020    (No Known Allergies)            The following portions of the patient's history were reviewed and updated as appropriate: allergies, current medications, past family history, past medical history, past social history, past surgical history and problem list   History reviewed  No pertinent past medical history    Past Surgical History:   Procedure Laterality Date     SECTION       Social History     Socioeconomic History    Marital status: /Civil Union     Spouse name: Not on file    Number of children: Not on file    Years of education: Not on file    Highest education level: Not on file   Occupational History    Not on file   Social Needs    Financial resource strain: Not on file    Food insecurity:     Worry: Not on file     Inability: Not on file    Transportation needs:     Medical: Not on file     Non-medical: Not on file   Tobacco Use    Smoking status: Never Smoker    Smokeless tobacco: Never Used   Substance and Sexual Activity    Alcohol use: Yes     Frequency: Monthly or less     Drinks per session: 1 or 2     Comment: ocassional    Drug use: No    Sexual activity: Yes     Partners: Male   Lifestyle    Physical activity:     Days per week: Not on file     Minutes per session: Not on file    Stress: Not on file   Relationships    Social connections:     Talks on phone: Not on file     Gets together: Not on file     Attends [employer] service: Not on file     Active member of club or organization: Not on file     Attends meetings of clubs or organizations: Not on file     Relationship status: Not on file    Intimate partner violence:     Fear of current or ex partner: Not on file     Emotionally abused: Not on file     Physically abused: Not on file     Forced sexual activity: Not on file   Other Topics Concern    Not on file   Social History Narrative    Not on file       Objective   /84 (BP Location: Right arm, Patient Position: Sitting, Cuff Size: Standard)   Pulse 72   Temp 98 6 °F (37 °C) (Tympanic)   Resp 20   Ht 5' 7" (1 702 m)   Wt 112 kg (246 lb)   SpO2 99%   BMI 38 53 kg/m²      Physical Exam     Physical Exam   Constitutional: She is oriented to person, place, and time  She appears well-developed and well-nourished  No distress  HENT:   Head: Normocephalic and atraumatic  Right Ear: Tympanic membrane and external ear normal    Left Ear: Tympanic membrane and external ear normal    Nose: Nose normal    Mouth/Throat: Oropharynx is clear and moist  No oropharyngeal exudate  Eyes: Pupils are equal, round, and reactive to light  Conjunctivae and EOM are normal  Right eye exhibits no discharge   Left eye exhibits no discharge  No scleral icterus  Slit lamp exam:       The left eye shows corneal abrasion  Neck: Normal range of motion  Neck supple  No JVD present  No tracheal deviation present  No thyromegaly present  Cardiovascular: Normal rate, regular rhythm and normal heart sounds  Exam reveals no gallop and no friction rub  No murmur heard  Pulmonary/Chest: Effort normal and breath sounds normal  No stridor  No respiratory distress  She has no decreased breath sounds  She has no wheezes  She has no rhonchi  She has no rales  She exhibits no tenderness  Musculoskeletal: Normal range of motion  She exhibits no tenderness or deformity  Lymphadenopathy:     She has no cervical adenopathy  Neurological: She is alert and oriented to person, place, and time  Coordination normal    Skin: Skin is warm and dry  No rash noted  She is not diaphoretic  No erythema  No pallor  Psychiatric: She has a normal mood and affect  Her behavior is normal  Judgment and thought content normal    Nursing note and vitals reviewed        Mare Mead PA-C

## 2021-12-10 ENCOUNTER — NURSE TRIAGE (OUTPATIENT)
Dept: OTHER | Facility: OTHER | Age: 41
End: 2021-12-10

## 2021-12-10 DIAGNOSIS — Z20.822 ENCOUNTER FOR SCREENING LABORATORY TESTING FOR COVID-19 VIRUS: Primary | ICD-10-CM

## 2021-12-10 PROCEDURE — U0003 INFECTIOUS AGENT DETECTION BY NUCLEIC ACID (DNA OR RNA); SEVERE ACUTE RESPIRATORY SYNDROME CORONAVIRUS 2 (SARS-COV-2) (CORONAVIRUS DISEASE [COVID-19]), AMPLIFIED PROBE TECHNIQUE, MAKING USE OF HIGH THROUGHPUT TECHNOLOGIES AS DESCRIBED BY CMS-2020-01-R: HCPCS | Performed by: FAMILY MEDICINE

## 2021-12-10 PROCEDURE — U0005 INFEC AGEN DETEC AMPLI PROBE: HCPCS | Performed by: FAMILY MEDICINE

## 2023-03-22 ENCOUNTER — OFFICE VISIT (OUTPATIENT)
Dept: URGENT CARE | Facility: CLINIC | Age: 43
End: 2023-03-22

## 2023-03-22 VITALS
DIASTOLIC BLOOD PRESSURE: 75 MMHG | SYSTOLIC BLOOD PRESSURE: 135 MMHG | HEIGHT: 66 IN | TEMPERATURE: 97 F | HEART RATE: 87 BPM | OXYGEN SATURATION: 98 % | RESPIRATION RATE: 16 BRPM | BODY MASS INDEX: 40.98 KG/M2 | WEIGHT: 255 LBS

## 2023-03-22 DIAGNOSIS — J02.0 STREP PHARYNGITIS: Primary | ICD-10-CM

## 2023-03-22 DIAGNOSIS — J02.9 SORE THROAT: ICD-10-CM

## 2023-03-22 LAB — S PYO AG THROAT QL: POSITIVE

## 2023-03-22 RX ORDER — DIPHENOXYLATE HYDROCHLORIDE AND ATROPINE SULFATE 2.5; .025 MG/1; MG/1
1 TABLET ORAL DAILY
COMMUNITY

## 2023-03-22 RX ORDER — AMOXICILLIN 500 MG/1
500 CAPSULE ORAL EVERY 12 HOURS SCHEDULED
Qty: 20 CAPSULE | Refills: 0 | Status: SHIPPED | OUTPATIENT
Start: 2023-03-22 | End: 2023-04-01

## 2023-03-22 RX ORDER — PREDNISONE 10 MG/1
TABLET ORAL
Qty: 21 TABLET | Refills: 0 | Status: SHIPPED | OUTPATIENT
Start: 2023-03-22

## 2023-03-22 NOTE — PROGRESS NOTES
[address] [facility]        NAME: Kenyatta Knox is a 41 y o  female  : 1980    MRN: [id number]  DATE: 2023  TIME: 12:24 PM    Assessment and Plan   Strep pharyngitis [J02 0]  1  Strep pharyngitis  amoxicillin (AMOXIL) 500 mg capsule    predniSONE 10 mg tablet      2  Sore throat  POCT rapid strepA            Patient Instructions     Take the antibiotics with food  Finish the entire course of antibiotics  Use a warm mist humidifier or vaporizer  Hot tea with honey  Warm saline gargle or throat lozenge may help with a sore throat  OTC saline nasal sprays   Drink plenty of fluids  The rapid strep was negative  A throat culture was sent and will take two days  Take prednisone with food  Do not take with NSAIDs  Take tylenol as needed for any fevers  Follow up with PCP in 3-5 days  Proceed to  ER if symptoms worsen  Chief Complaint     Chief Complaint   Patient presents with   • Sore Throat     Started on  with headache fever and sore throat  swelling in throat and ear pain in right ear          History of Present Illness       Sore Throat   This is a new problem  The current episode started in the past 7 days  The problem has been gradually worsening  Neither side of throat is experiencing more pain than the other  There has been no fever  The pain is moderate (Patient states she has difficulty with swallowing food due to the pain  )  Associated symptoms include congestion, ear pain, swollen glands and trouble swallowing  Pertinent negatives include no abdominal pain, coughing, diarrhea, headaches, hoarse voice or vomiting  She has tried acetaminophen for the symptoms  The treatment provided mild relief  Review of Systems   Review of Systems   Constitutional: Positive for chills and fever  HENT: Positive for congestion, ear pain, sore throat and trouble swallowing  Negative for hoarse voice  Respiratory: Negative for cough      Gastrointestinal: Negative for abdominal pain, diarrhea and vomiting  Neurological: Negative for headaches  All other systems reviewed and are negative  Current Medications       Current Outpatient Medications:   •  amoxicillin (AMOXIL) 500 mg capsule, Take 1 capsule (500 mg total) by mouth every 12 (twelve) hours for 10 days, Disp: 20 capsule, Rfl: 0  •  predniSONE 10 mg tablet, Take 6 pills day 1, then 5 pills day 2, 4 pills day 3, 3 pills day 4, 2 pills day 5, 1 pill day 6,, Disp: 21 tablet, Rfl: 0  •  multivitamin (THERAGRAN) TABS, Take 1 tablet by mouth daily, Disp: , Rfl:     Current Allergies     Allergies as of 2023   • (No Known Allergies)            The following portions of the patient's history were reviewed and updated as appropriate: allergies, current medications, past family history, past medical history, past social history, past surgical history and problem list      No past medical history on file  Past Surgical History:   Procedure Laterality Date   •  SECTION         Family History   Problem Relation Age of Onset   • No Known Problems Mother    • No Known Problems Father          Medications have been verified  Objective   /75   Pulse 87   Temp (!) 97 °F (36 1 °C)   Resp 16   Ht 5' 6" (1 676 m)   Wt 116 kg (255 lb)   SpO2 98%   BMI 41 16 kg/m²        Physical Exam     Physical Exam  Vitals reviewed  Constitutional:       Appearance: She is well-developed and normal weight  HENT:      Head: Normocephalic and atraumatic  Right Ear: Tympanic membrane and ear canal normal       Left Ear: Tympanic membrane and ear canal normal       Nose: No congestion or rhinorrhea  Mouth/Throat:      Mouth: Mucous membranes are moist       Pharynx: Uvula midline  Pharyngeal swelling and posterior oropharyngeal erythema present  No oropharyngeal exudate  Tonsils: No tonsillar exudate or tonsillar abscesses     Eyes:      Conjunctiva/sclera: Conjunctivae normal       Pupils: Pupils are equal, round, and reactive to light  Cardiovascular:      Rate and Rhythm: Normal rate and regular rhythm  Heart sounds: Normal heart sounds  Pulmonary:      Effort: Pulmonary effort is normal       Breath sounds: Normal breath sounds  No stridor  No wheezing, rhonchi or rales  Musculoskeletal:      Cervical back: Normal range of motion  Skin:     General: Skin is warm and dry  Neurological:      General: No focal deficit present  Mental Status: She is alert and oriented to person, place, and time     Psychiatric:         Mood and Affect: Mood normal          Behavior: Behavior normal

## 2023-03-22 NOTE — PATIENT INSTRUCTIONS
Take the antibiotics with food  Finish the entire course of antibiotics  Use a warm mist humidifier or vaporizer  Hot tea with honey  Warm saline gargle or throat lozenge may help with a sore throat  OTC saline nasal sprays   Drink plenty of fluids  The rapid strep was negative  A throat culture was sent and will take two days  Take prednisone with food  Do not take with NSAIDs  Take tylenol as needed for any fevers

## 2024-12-31 ENCOUNTER — HOSPITAL ENCOUNTER (EMERGENCY)
Facility: HOSPITAL | Age: 44
Discharge: HOME | End: 2024-12-31
Attending: STUDENT IN AN ORGANIZED HEALTH CARE EDUCATION/TRAINING PROGRAM
Payer: COMMERCIAL

## 2024-12-31 ENCOUNTER — APPOINTMENT (EMERGENCY)
Dept: RADIOLOGY | Facility: HOSPITAL | Age: 44
End: 2024-12-31
Payer: COMMERCIAL

## 2024-12-31 VITALS
WEIGHT: 275 LBS | BODY MASS INDEX: 43.16 KG/M2 | TEMPERATURE: 97.3 F | DIASTOLIC BLOOD PRESSURE: 94 MMHG | HEIGHT: 67 IN | OXYGEN SATURATION: 96 % | SYSTOLIC BLOOD PRESSURE: 148 MMHG | HEART RATE: 92 BPM | RESPIRATION RATE: 18 BRPM

## 2024-12-31 DIAGNOSIS — S93.402A SPRAIN OF LEFT ANKLE, UNSPECIFIED LIGAMENT, INITIAL ENCOUNTER: Primary | ICD-10-CM

## 2024-12-31 PROCEDURE — 99283 EMERGENCY DEPT VISIT LOW MDM: CPT

## 2024-12-31 PROCEDURE — 73610 X-RAY EXAM OF ANKLE: CPT

## 2024-12-31 PROCEDURE — 99284 EMERGENCY DEPT VISIT MOD MDM: CPT | Performed by: STUDENT IN AN ORGANIZED HEALTH CARE EDUCATION/TRAINING PROGRAM

## 2024-12-31 NOTE — DISCHARGE INSTRUCTIONS
The imaging studies that were obtained did not show signs of fracture or dislocation.    Wear the ankle air splint for comfort.  For pain, you can take ibuprofen 600 mg every 6 hours and Tylenol 1000 mg every 6 hours.  Apply ice to the ankle for the next 24 to 48 hours.  20 minutes on, 20 minutes off.  Keep the ankle elevated to reduce pain/swelling.    Follow-up with your PCP.  Return to the emergency department for any concerning signs or symptoms.

## 2024-12-31 NOTE — ED PROVIDER NOTES
Time reflects when diagnosis was documented in both MDM as applicable and the Disposition within this note       Time User Action Codes Description Comment    2024  1:22 PM Kobi Andre Add [S93.402A] Sprain of left ankle, unspecified ligament, initial encounter           ED Disposition       ED Disposition   Discharge    Condition   Stable    Date/Time   Tue Dec 31, 2024  1:22 PM    Comment   Anastasia Torrez discharge to home/self care.                   Assessment & Plan       Medical Decision Making  This patient presents with left ankle pain/swelling.   Diagnostic considerations include bimalleolar fracture, trimalleolar fracture, ligamentous injury, sprain.    Vital signs reviewed.  The distal aspect of the left lower leg is neurovascularly intact.  X-ray imaging without signs of acute fracture/dislocation.  Air splint applied.  Conservative measures/return precautions were discussed.  All questions addressed.  Stable for discharge.        Problems Addressed:  Sprain of left ankle, unspecified ligament, initial encounter: acute illness or injury    Amount and/or Complexity of Data Reviewed  Radiology: ordered and independent interpretation performed.     Details: No acute osseous abnormalities noted on x-ray imaging.      Medications - No data to display    ED Risk Strat Scores        History of Present Illness       Chief Complaint   Patient presents with    Ankle Injury     Pt tripped on hole on sidewalk yesterday- unable to bare weight on to left ankle, pain radiates up in to shin        History reviewed. No pertinent past medical history.   Past Surgical History:   Procedure Laterality Date     SECTION        Family History   Problem Relation Age of Onset    No Known Problems Mother     No Known Problems Father       Social History     Tobacco Use    Smoking status: Never    Smokeless tobacco: Never   Substance Use Topics    Alcohol use: Yes     Comment: ocassional    Drug use: No       E-Cigarette/Vaping      E-Cigarette/Vaping Substances      I have reviewed and agree with the history as documented.       History provided by:  Patient  Ankle Injury  Location:  Left lateral ankle  Severity:  Moderate  Onset quality:  Gradual  Duration:  1 day  Timing:  Constant  Progression:  Unchanged  Chronicity:  New  Context:  Patient states that she sustained an inversion injury to her left ankle x 1 day ago which walking on the sidewalk. Denies other injuries. Improvement with NSAID medication.  Relieved by:  NSAID medication  Worsened by:  Bearing weight, ambulation    Review of Systems   Musculoskeletal:  Positive for arthralgias, gait problem and joint swelling.   All other systems reviewed and are negative.    Objective       ED Triage Vitals [12/31/24 1058]   Temperature Pulse Blood Pressure Respirations SpO2 Patient Position - Orthostatic VS   (!) 97.3 °F (36.3 °C) 92 148/94 18 96 % Sitting      Temp Source Heart Rate Source BP Location FiO2 (%) Pain Score    Temporal Monitor Left arm -- 4      Vitals      Date and Time Temp Pulse SpO2 Resp BP Pain Score FACES Pain Rating User   12/31/24 1058 97.3 °F (36.3 °C) 92 96 % 18 148/94 4 -- SS            Physical Exam  Vitals and nursing note reviewed.   Constitutional:       General: She is not in acute distress.     Appearance: She is not ill-appearing or toxic-appearing.   HENT:      Head: Normocephalic and atraumatic.      Right Ear: External ear normal.      Left Ear: External ear normal.      Nose: No congestion or rhinorrhea.   Eyes:      General: No scleral icterus.        Right eye: No discharge.         Left eye: No discharge.      Extraocular Movements: Extraocular movements intact.      Conjunctiva/sclera: Conjunctivae normal.   Cardiovascular:      Rate and Rhythm: Normal rate and regular rhythm.      Pulses: Normal pulses.      Heart sounds: Normal heart sounds. No murmur heard.  Pulmonary:      Effort: Pulmonary effort is normal. No  respiratory distress.      Breath sounds: Normal breath sounds. No stridor. No wheezing, rhonchi or rales.   Chest:      Chest wall: No tenderness.   Abdominal:      General: Bowel sounds are normal. There is no distension.      Palpations: Abdomen is soft.      Tenderness: There is no abdominal tenderness. There is no guarding or rebound.   Musculoskeletal:         General: Swelling, tenderness and signs of injury present.      Right lower leg: No edema.      Left lower leg: No edema.      Comments: Localized tenderness along the lateral aspect of the left ankle.  There is associated swelling.  No signs of close deformity.  Dorsalis pedis pulse palpated.  No bruising.   Skin:     General: Skin is warm and dry.      Coloration: Skin is not jaundiced or pale.      Findings: No bruising.   Neurological:      General: No focal deficit present.      Mental Status: She is alert and oriented to person, place, and time.   Psychiatric:         Mood and Affect: Mood normal.         Behavior: Behavior normal.         Results Reviewed       None            XR ankle 3+ views LEFT   ED Interpretation by Kobi Andre DO (12/31 1304)   No signs of acute osseous abnormality          Procedures    ED Medication and Procedure Management   Prior to Admission Medications   Prescriptions Last Dose Informant Patient Reported? Taking?   multivitamin (THERAGRAN) TABS   Yes No   Sig: Take 1 tablet by mouth daily   predniSONE 10 mg tablet   No No   Sig: Take 6 pills day 1, then 5 pills day 2, 4 pills day 3, 3 pills day 4, 2 pills day 5, 1 pill day 6,      Facility-Administered Medications: None     Discharge Medication List as of 12/31/2024  1:23 PM        CONTINUE these medications which have NOT CHANGED    Details   multivitamin (THERAGRAN) TABS Take 1 tablet by mouth daily, Historical Med      predniSONE 10 mg tablet Take 6 pills day 1, then 5 pills day 2, 4 pills day 3, 3 pills day 4, 2 pills day 5, 1 pill day 6,, Normal            No discharge procedures on file.  ED SEPSIS DOCUMENTATION   Time reflects when diagnosis was documented in both MDM as applicable and the Disposition within this note       Time User Action Codes Description Comment    12/31/2024  1:22 PM Kobi Andre Add [S93.402A] Sprain of left ankle, unspecified ligament, initial encounter                  Kobi Andre,   12/31/24 2048

## 2025-03-24 ENCOUNTER — OFFICE VISIT (OUTPATIENT)
Dept: URGENT CARE | Facility: MEDICAL CENTER | Age: 45
End: 2025-03-24
Payer: COMMERCIAL

## 2025-03-24 VITALS
HEIGHT: 66 IN | WEIGHT: 179.6 LBS | RESPIRATION RATE: 18 BRPM | TEMPERATURE: 96.7 F | SYSTOLIC BLOOD PRESSURE: 136 MMHG | DIASTOLIC BLOOD PRESSURE: 88 MMHG | OXYGEN SATURATION: 98 % | BODY MASS INDEX: 28.87 KG/M2 | HEART RATE: 88 BPM

## 2025-03-24 DIAGNOSIS — J20.9 ACUTE BRONCHITIS, UNSPECIFIED ORGANISM: Primary | ICD-10-CM

## 2025-03-24 PROBLEM — K21.9 GASTROESOPHAGEAL REFLUX DISEASE WITHOUT ESOPHAGITIS: Status: ACTIVE | Noted: 2023-03-31

## 2025-03-24 PROBLEM — E66.01 SEVERE OBESITY (BMI >= 40) (HCC): Status: ACTIVE | Noted: 2023-03-31

## 2025-03-24 PROCEDURE — 99213 OFFICE O/P EST LOW 20 MIN: CPT

## 2025-03-24 RX ORDER — PANTOPRAZOLE SODIUM 20 MG/1
20 TABLET, DELAYED RELEASE ORAL DAILY
COMMUNITY

## 2025-03-24 RX ORDER — AZITHROMYCIN 250 MG/1
TABLET, FILM COATED ORAL
Qty: 6 TABLET | Refills: 0 | Status: SHIPPED | OUTPATIENT
Start: 2025-03-24 | End: 2025-03-28

## 2025-03-24 NOTE — PROGRESS NOTES
[facility]        NAME: Anastasia Torrez is a 45 y.o. female  : 1980    MRN: [id number]  DATE: 2025  TIME: 11:33 AM    Assessment and Plan   Acute bronchitis, unspecified organism [J20.9]  1. Acute bronchitis, unspecified organism  azithromycin (ZITHROMAX) 250 mg tablet            Patient Instructions       Follow up with PCP in 3-5 days.  Proceed to  ER if symptoms worsen.    If tests are performed, our office will contact you with results only if changes need to made to the care plan discussed with you at the visit. You can review your full results on [facility].    Chief Complaint     Chief Complaint   Patient presents with   • Cough     Started 1 week ago. Questionable bronchitis. Low grade fevers since Tuesday on and off. Fever of 99 this am. Green sputum w/coughing.  OTC mucinex taking.    • Fever   • Nasal Congestion         History of Present Illness       Patient presents with x1 week of cough and low grade fevers. This AM temp was 99. Reports cough is productive for thick green sputum. Taking OTC Mucinex. Also having sinus congestion. She reports symptoms have gotten worse since initially started. She has not had any specific known ill contacts. Also was taking sudafed when it initially started. She is also taking ibuprofen for the fever. No chronic lung problems. Does not smoke.     Cough  This is a new problem. The current episode started in the past 7 days. The problem has been rapidly worsening. The problem occurs constantly. The cough is Productive of sputum. Associated symptoms include chills, ear congestion, a fever, heartburn, myalgias, nasal congestion, postnasal drip, rhinorrhea, a sore throat, shortness of breath, sweats and wheezing. Pertinent negatives include no headaches. Nothing aggravates the symptoms.   Fever  Associated symptoms include chills, congestion, coughing, a fever, myalgias and a sore throat. Pertinent negatives include no abdominal pain,  "fatigue, headaches, nausea or vomiting.       Review of Systems   Review of Systems   Constitutional:  Positive for chills and fever. Negative for appetite change and fatigue.   HENT:  Positive for congestion, postnasal drip, rhinorrhea, sinus pressure, sinus pain and sore throat. Negative for trouble swallowing.    Respiratory:  Positive for cough, shortness of breath and wheezing.    Gastrointestinal:  Positive for heartburn. Negative for abdominal pain, constipation, diarrhea, nausea and vomiting.   Musculoskeletal:  Positive for myalgias.   Neurological:  Negative for dizziness, light-headedness and headaches.         Current Medications       Current Outpatient Medications:   •  azithromycin (ZITHROMAX) 250 mg tablet, Take 2 tablets today then 1 tablet daily x 4 days, Disp: 6 tablet, Rfl: 0  •  Levonorgestrel (MIRENA) 20 MCG/DAY IUD, 1 each by Intrauterine Device route Once every 8 years, Disp: , Rfl:   •  pantoprazole (PROTONIX) 20 mg tablet, Take 20 mg by mouth daily, Disp: , Rfl:     Current Allergies     Allergies as of 2025   • (No Known Allergies)            The following portions of the patient's history were reviewed and updated as appropriate: allergies, current medications, past family history, past medical history, past social history, past surgical history and problem list.     Past Medical History:   Diagnosis Date   • GERD (gastroesophageal reflux disease)        Past Surgical History:   Procedure Laterality Date   •  SECTION         Family History   Problem Relation Age of Onset   • No Known Problems Mother    • No Known Problems Father          Medications have been verified.        Objective   /88   Pulse 88   Temp (!) 96.7 °F (35.9 °C)   Resp 18   Ht 5' 6\" (1.676 m)   Wt 81.5 kg (179 lb 9.6 oz)   SpO2 98%   BMI 28.99 kg/m²        Physical Exam     Physical Exam  Vitals and nursing note reviewed.   Constitutional:       General: She is awake. She is not in acute " distress.     Appearance: Normal appearance. She is well-developed and normal weight. She is not ill-appearing.   HENT:      Head: Normocephalic and atraumatic.      Right Ear: Tympanic membrane, ear canal and external ear normal.      Left Ear: Tympanic membrane, ear canal and external ear normal.      Nose: Nose normal.      Mouth/Throat:      Lips: Pink.      Mouth: Mucous membranes are moist.      Pharynx: Oropharynx is clear.   Eyes:      Extraocular Movements: Extraocular movements intact.      Conjunctiva/sclera: Conjunctivae normal.      Pupils: Pupils are equal, round, and reactive to light.   Cardiovascular:      Rate and Rhythm: Normal rate and regular rhythm.      Pulses: Normal pulses.      Heart sounds: Normal heart sounds.   Pulmonary:      Effort: Pulmonary effort is normal.      Breath sounds: Normal breath sounds. No decreased breath sounds, wheezing, rhonchi or rales.   Abdominal:      General: Abdomen is flat. Bowel sounds are normal.      Palpations: Abdomen is soft.   Musculoskeletal:         General: Normal range of motion.      Cervical back: Full passive range of motion without pain, normal range of motion and neck supple.   Skin:     General: Skin is warm and dry.      Capillary Refill: Capillary refill takes less than 2 seconds.      Findings: No rash.   Neurological:      General: No focal deficit present.      Mental Status: She is alert and oriented to person, place, and time. Mental status is at baseline.   Psychiatric:         Mood and Affect: Mood normal.         Behavior: Behavior normal. Behavior is cooperative.